# Patient Record
Sex: FEMALE
[De-identification: names, ages, dates, MRNs, and addresses within clinical notes are randomized per-mention and may not be internally consistent; named-entity substitution may affect disease eponyms.]

---

## 2024-04-01 ENCOUNTER — APPOINTMENT (OUTPATIENT)
Dept: NEUROSURGERY | Facility: CLINIC | Age: 34
End: 2024-04-01
Payer: COMMERCIAL

## 2024-04-01 VITALS
HEIGHT: 69 IN | WEIGHT: 150 LBS | HEART RATE: 73 BPM | OXYGEN SATURATION: 98 % | SYSTOLIC BLOOD PRESSURE: 122 MMHG | BODY MASS INDEX: 22.22 KG/M2 | DIASTOLIC BLOOD PRESSURE: 74 MMHG | RESPIRATION RATE: 18 BRPM

## 2024-04-01 DIAGNOSIS — G93.5 COMPRESSION OF BRAIN: ICD-10-CM

## 2024-04-01 DIAGNOSIS — Z87.898 PERSONAL HISTORY OF OTHER SPECIFIED CONDITIONS: ICD-10-CM

## 2024-04-01 PROBLEM — Z00.00 ENCOUNTER FOR PREVENTIVE HEALTH EXAMINATION: Status: ACTIVE | Noted: 2024-04-01

## 2024-04-01 PROCEDURE — 99205 OFFICE O/P NEW HI 60 MIN: CPT

## 2024-04-01 RX ORDER — FEXOFENADINE HYDROCHLORIDE 60 MG/1
TABLET, FILM COATED ORAL
Refills: 0 | Status: ACTIVE | COMMUNITY

## 2024-04-01 NOTE — ASSESSMENT
[FreeTextEntry1] : History reviewed with patient. Recommend MRI head with and without contrast and MRA of head and neck. Return to office after to review.   I, Dr. Galan, personally performed the evaluation and management (E/M) services for this new patient.  That E/M includes conducting the initial examination, assessing all conditions, and establishing the plan of care.  Today, my ACP, Laurel Menon, was here to observe my evaluation and management services for this patient to be followed going forward.  Patient and patient's family verbalize agreement and understanding with plan of care.

## 2024-04-08 NOTE — REASON FOR VISIT
[Home] : at home, [unfilled] , at the time of the visit. [Medical Office: ([facility])___] : at the medical office located in

## 2024-04-09 ENCOUNTER — APPOINTMENT (OUTPATIENT)
Dept: NEUROSURGERY | Facility: CLINIC | Age: 34
End: 2024-04-09
Payer: COMMERCIAL

## 2024-04-09 ENCOUNTER — APPOINTMENT (OUTPATIENT)
Dept: MRI IMAGING | Facility: CLINIC | Age: 34
End: 2024-04-09
Payer: COMMERCIAL

## 2024-04-09 ENCOUNTER — OUTPATIENT (OUTPATIENT)
Dept: OUTPATIENT SERVICES | Facility: HOSPITAL | Age: 34
LOS: 1 days | End: 2024-04-09

## 2024-04-09 PROCEDURE — 70553 MRI BRAIN STEM W/O & W/DYE: CPT | Mod: 26

## 2024-04-09 PROCEDURE — 99214 OFFICE O/P EST MOD 30 MIN: CPT

## 2024-04-09 PROCEDURE — 70547 MR ANGIOGRAPHY NECK W/O DYE: CPT | Mod: 26

## 2024-04-09 PROCEDURE — 70544 MR ANGIOGRAPHY HEAD W/O DYE: CPT | Mod: 26,59

## 2024-04-09 NOTE — REVIEW OF SYSTEMS
[As Noted in HPI] : as noted in HPI [Negative] : Heme/Lymph [FreeTextEntry3] : Floaters per patient and she has seen ophthalmology about this [FreeTextEntry5] : Seeing cardiology for workup

## 2024-04-09 NOTE — DATA REVIEWED
[de-identified] : 4/9/2024 Chiari malformation with tonsils approx 6 mm below foramen magnum, otherwise no other obvious pathology (no formal radiologist report available yet) [de-identified] : 4/9/2024 Head and Neck. 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

## 2024-04-09 NOTE — PHYSICAL EXAM
[General Appearance - Alert] : alert [General Appearance - In No Acute Distress] : in no acute distress [General Appearance - Well Nourished] : well nourished [General Appearance - Well Developed] : well developed [General Appearance - Well-Appearing] : healthy appearing [Oriented To Time, Place, And Person] : oriented to person, place, and time [Impaired Insight] : insight and judgment were intact [Affect] : the affect was normal [Mood] : the mood was normal [Memory Recent] : recent memory was not impaired [Memory Remote] : remote memory was not impaired [Motor Tone] : muscle tone was normal in all four extremities [Motor Strength] : muscle strength was normal in all four extremities [No Muscle Atrophy] : normal bulk in all four extremities [No Visual Abnormalities] : no visible abnormailities [Sclera] : the sclera and conjunctiva were normal [Extraocular Movements] : extraocular movements were intact [Neck Appearance] : the appearance of the neck was normal [] : no respiratory distress [Abnormal Walk] : normal gait [Skin Color & Pigmentation] : normal skin color and pigmentation

## 2024-04-09 NOTE — ASSESSMENT
[FreeTextEntry1] : Patient with remote history of possible seisure, never on AEDs fully worked up year ago. New brain imaging unrevealing; Chiari malformation asymptomatic. Cerebrovascular imaging unrevealing. No neurosurgical intervention needed at this time.   I, Dr. Galan, personally performed the evaluation and management (E/M) services for this new patient.  That E/M includes conducting the initial examination, assessing all conditions, and establishing the plan of care.  Today, my ACP, Becky Gill, was here to observe my evaluation and management services for this patient to be followed going forward.  Patient and patient's family verbalize agreement and understanding with plan of care.

## 2024-04-09 NOTE — HISTORY OF PRESENT ILLNESS
[de-identified] : Ms. Brooks presents today referred by cardiologist Dr. Barbosa for clearance to climb [place] leaving on 4/11/24.   She has been having chest pain for the past 2 weeks, has been training doing easy-moderate runs, heavy weighted workouts. During her cardiology visit, it was noted in her past medical history of neurologic events notably partial seizure and type 1 Chiari malformation.   She reports 3 years ago she was sitting in a chair and had a few seconds of blacking out mid conversation with a coworker. She went to her local ER and was diagnosed with a partial seizure. She had a MRI done which incidentally showed type 1 Chiari malformation per patient (no report). During this time she also had EEG done. She had one more episode of feeling like the floor gave out from underneath her during this time. She was never on antiepileptic medication.   She additionally more recently has been experiencing floaters for the past month, more with exertion.  She denies headaches, n/v, weakness, no further seizure like episodes.   She comes back today with a new MRI/MRA for review.